# Patient Record
Sex: FEMALE | Race: WHITE | NOT HISPANIC OR LATINO | URBAN - METROPOLITAN AREA
[De-identification: names, ages, dates, MRNs, and addresses within clinical notes are randomized per-mention and may not be internally consistent; named-entity substitution may affect disease eponyms.]

---

## 2021-04-17 ENCOUNTER — WEB ENCOUNTER (OUTPATIENT)
Dept: URBAN - METROPOLITAN AREA CLINIC 35 | Facility: CLINIC | Age: 36
End: 2021-04-17

## 2021-04-20 ENCOUNTER — TELEPHONE ENCOUNTER (OUTPATIENT)
Dept: URBAN - METROPOLITAN AREA CLINIC 35 | Facility: CLINIC | Age: 36
End: 2021-04-20

## 2021-04-20 ENCOUNTER — OFFICE VISIT (OUTPATIENT)
Dept: URBAN - METROPOLITAN AREA CLINIC 35 | Facility: CLINIC | Age: 36
End: 2021-04-20

## 2021-04-20 VITALS
OXYGEN SATURATION: 99 % | HEIGHT: 69 IN | HEART RATE: 60 BPM | DIASTOLIC BLOOD PRESSURE: 70 MMHG | SYSTOLIC BLOOD PRESSURE: 119 MMHG | BODY MASS INDEX: 21.92 KG/M2 | WEIGHT: 148 LBS

## 2021-04-20 RX ORDER — LEVOTHYROXINE SODIUM 0.05 MG/1
1 TABLET IN THE MORNING ON AN EMPTY STOMACH TABLET ORAL ONCE A DAY
Qty: 30 | Status: ACTIVE | COMMUNITY

## 2021-04-20 NOTE — EXAM-MIGRATED EXAMINATIONS
GENERAL APPEARANCE: - alert, in no acute distress, well developed, well nourished;   HEAD: - normocephalic, atraumatic;   EYES: - sclera anicteric bilaterally;   ORAL CAVITY: - mucosa moist, MP 1;   NECK/THYROID: - neck supple, full range of motion, no cervical lymphadenopathy, no thyroid nodules, no thyromegaly, trachea midline;   SKIN: - no suspicious lesions, warm and dry, no spider angiomata, palmar erythema or icterus;   HEART: - no murmurs, regular rate and rhythm, S1, S2 normal;   LUNGS: - clear to auscultation bilaterally, good air movement, no wheezes, rales, rhonchi;   ABDOMEN: - bowel sounds present, no masses palpable, no organomegaly , no rebound tenderness, soft, nontender, nondistended;   MUSCULOSKELETAL: - normal posture, normal gait and station, no decreased range of motion;   EXTREMITIES: - no clubbing, cyanosis, or edema;   PSYCH: - cooperative with exam, mood/affect full range;

## 2021-04-20 NOTE — HPI-MIGRATED HPI
;     Rectal Bleeding : 36 year old female presents today for rectal bleeding. Onset 1-2 years ago and she feels that it is becoming more frequent. Blood is present in the water, on top of the stool and on the toilet paper. Blood is bright red in color. She feels ''a skin tag''. Patient states that she believes she has developed hemorrhoids due to lifting weights. 1 BM per day, with normal stool. No melena, or mucus.   She admits lifting weights for 2 years  No abdominal pain, weight loss, nausea, or emesis. No fever.  No IBD in family.  Grandfather die of gastric cancer at age 75. No colon cancer.;

## 2021-04-26 ENCOUNTER — OFFICE VISIT (OUTPATIENT)
Dept: URBAN - METROPOLITAN AREA SURGERY CENTER 8 | Facility: SURGERY CENTER | Age: 36
End: 2021-04-26

## 2021-05-11 ENCOUNTER — OFFICE VISIT (OUTPATIENT)
Dept: URBAN - METROPOLITAN AREA CLINIC 35 | Facility: CLINIC | Age: 36
End: 2021-05-11

## 2021-05-11 VITALS
TEMPERATURE: 97.9 F | WEIGHT: 146 LBS | DIASTOLIC BLOOD PRESSURE: 68 MMHG | SYSTOLIC BLOOD PRESSURE: 102 MMHG | BODY MASS INDEX: 21.62 KG/M2 | HEIGHT: 69 IN | HEART RATE: 59 BPM

## 2021-05-11 RX ORDER — LEVOTHYROXINE SODIUM 0.05 MG/1
1 TABLET IN THE MORNING ON AN EMPTY STOMACH TABLET ORAL ONCE A DAY
Qty: 30 | Status: ACTIVE | COMMUNITY

## 2021-05-11 NOTE — EXAM-MIGRATED EXAMINATIONS
GENERAL APPEARANCE: - alert, in no acute distress, well developed, well nourished;   HEAD: - normocephalic, atraumatic;   EYES: - sclera anicteric bilaterally;   SKIN: - no suspicious lesions, warm and dry, no spider angiomata, palmar erythema or icterus;   HEART: - no murmurs, regular rate and rhythm, S1, S2 normal;   LUNGS: - clear to auscultation bilaterally, good air movement, no wheezes, rales, rhonchi;   ABDOMEN: - bowel sounds present, no masses palpable, no organomegaly , no rebound tenderness, soft, nontender, nondistended;   EXTREMITIES: - no clubbing, cyanosis, or edema;   PSYCH: - cooperative with exam, mood/affect full range;

## 2021-05-11 NOTE — HPI-MIGRATED HPI
;     Rectal Bleeding : Patient presents today for follow up of rectal bleeding and to review colonoscopy completed on 4.26.2021. She denies any complications post procedure.   1 BMs per day, with normal stool. No melena, blood, or mucus.  She denies any more episodes of rectal bleeding.  GI MD Note: Colonoscopy: finding of rectal varices. Patient had splenectomy at age 4. She was told when she was 17 y/o had "fibrosis of liver" and used to have frequent labs collected. She has not had any issues since.  Last visit 4.20.21 36 year old female presents today for rectal bleeding. Onset 1-2 years ago and she feels that it is becoming more frequent. Blood is present in the water, on top of the stool and on the toilet paper. Blood is bright red in color. She feels '' a skin tag''. Patient states that she believes she has developed hemorrhoids due to lifting weights. 1 BM per day, with normal stool. No melena, or mucus.   She admits lifting weights for 2 years  No abdominal pain, weight loss, nausea, or emesis. No fever.  No IBD in family.  Grandfather die of gastric cancer at age 75. No colon cancer.;

## 2021-05-13 ENCOUNTER — TELEPHONE ENCOUNTER (OUTPATIENT)
Dept: URBAN - METROPOLITAN AREA CLINIC 35 | Facility: CLINIC | Age: 36
End: 2021-05-13

## 2021-05-14 ENCOUNTER — TELEPHONE ENCOUNTER (OUTPATIENT)
Dept: URBAN - METROPOLITAN AREA CLINIC 35 | Facility: CLINIC | Age: 36
End: 2021-05-14

## 2021-05-14 LAB
ABSOLUTE BASOPHILS: 90
ABSOLUTE EOSINOPHILS: 101
ABSOLUTE LYMPHOCYTES: 1696
ABSOLUTE MONOCYTES: 779
ABSOLUTE NEUTROPHILS: 2634
ALBUMIN/GLOBULIN RATIO: 1.7
ALBUMIN: 4.7
ALKALINE PHOSPHATASE: 53
ALT: 19
AST: 24
BASOPHILS: 1.7
BILIRUBIN, DIRECT: 0.1
BILIRUBIN, INDIRECT: 0.4
BILIRUBIN, TOTAL: 0.5
BUN/CREATININE RATIO: (no result)
CALCIUM: 9.8
CARBON DIOXIDE: 28
CHLORIDE: 102
CREATININE: 0.89
EGFR AFRICAN AMERICAN: 97
EGFR NON-AFR. AMERICAN: 83
EOSINOPHILS: 1.9
GLOBULIN: 2.8
GLUCOSE: 71
HEMATOCRIT: 38.6
HEMOGLOBIN: 12.9
HEPATITIS A AB, TOTAL: (no result)
HEPATITIS B SURFACE: (no result)
HEPATITIS B SURFACE: <5
HEPATITIS C ANTIBODY: (no result)
INR: 1.1
LYMPHOCYTES: 32
MCH: 31.7
MCHC: 33.4
MCV: 94.8
MONOCYTES: 14.7
MPV: 11.1
NEUTROPHILS: 49.7
PLATELET COUNT: 389
POTASSIUM: 4.3
PROTEIN, TOTAL: 7.5
PT: 11
RDW: 11.6
RED BLOOD CELL COUNT: 4.07
SIGNAL TO CUT-OFF: 0.46
SODIUM: 138
T4, FREE: 1.3
TSH: 3.86
UREA NITROGEN (BUN): 13
WHITE BLOOD CELL COUNT: 5.3

## 2021-05-19 ENCOUNTER — TELEPHONE ENCOUNTER (OUTPATIENT)
Dept: URBAN - METROPOLITAN AREA CLINIC 35 | Facility: CLINIC | Age: 36
End: 2021-05-19

## 2021-06-01 ENCOUNTER — TELEPHONE ENCOUNTER (OUTPATIENT)
Dept: URBAN - METROPOLITAN AREA CLINIC 35 | Facility: CLINIC | Age: 36
End: 2021-06-01

## 2021-06-08 ENCOUNTER — OFFICE VISIT (OUTPATIENT)
Dept: URBAN - METROPOLITAN AREA CLINIC 35 | Facility: CLINIC | Age: 36
End: 2021-06-08

## 2021-07-06 ENCOUNTER — OFFICE VISIT (OUTPATIENT)
Dept: URBAN - METROPOLITAN AREA CLINIC 35 | Facility: CLINIC | Age: 36
End: 2021-07-06

## 2021-07-06 VITALS
HEIGHT: 69 IN | DIASTOLIC BLOOD PRESSURE: 74 MMHG | BODY MASS INDEX: 21.03 KG/M2 | HEART RATE: 76 BPM | WEIGHT: 142 LBS | SYSTOLIC BLOOD PRESSURE: 112 MMHG | OXYGEN SATURATION: 99 %

## 2021-07-06 RX ORDER — LEVOTHYROXINE SODIUM 0.05 MG/1
1 TABLET IN THE MORNING ON AN EMPTY STOMACH TABLET ORAL ONCE A DAY
Qty: 30 | Status: ACTIVE | COMMUNITY

## 2021-07-06 NOTE — HPI-MIGRATED HPI
;     Rectal Bleeding : Patient presents today for a follow-up office visit from 05/11/2021. Patient presented initially with rectal bleeding. Colonoscopy subsequently revealed rectal varices. She had a CT Scan and labs completed on 05/13/2021 with results noted below. CT came back concerning for PV thrombosis. MRI was ordered for further evaluation. Due to insurance reason patient decided to have MRI done at a different facility ([facility]). completing Patient had MRI  done on 06/25/2021.  Currently she has 1 BM a day, stools are normal without blood, mucus or melena. Last occurrence of rectal bleeding was about two weeks ago. Blood was bright red only present on the toilet paper.     Per last visit MD Note: Patient was told many yrs ago to have "scarring of liver"; she was then approximately 19 y/o. Her MD's in [facility] used to check liver panel often. Patient needs work up for possible liver disease. Start with CT abd and pelvis and blood work as ordered.        Patient had a splenectomy in childhood.   MRI abdomen 6/25/21: NL GB and biliary tree, small amount fluid around tail of pancreas and increased fecal matter described. NO mention of PV or portal vein findings on CT.  After receiving MRI report, asked patient to bring a CD of initial CT study to MRI facility and I am planning to call Radiologist to explain case and compare films.        Last visit (05/11/2021) Patient presents today for follow up of rectal bleeding and to review colonoscopy completed on 4.26.2021. She denies any complications post procedure.   1 BM's per day, with normal stool. No melena, blood, or mucus.  She denies any more episodes of rectal bleeding.  GI MD Note: Colonoscopy: finding of rectal varices. Patient had splenectomy at age 4. She was told when she was 19 y/o had "fibrosis of liver" and used to have frequent labs collected. She has not had any issues since.  ;

## 2021-07-09 ENCOUNTER — TELEPHONE ENCOUNTER (OUTPATIENT)
Dept: URBAN - METROPOLITAN AREA CLINIC 35 | Facility: CLINIC | Age: 36
End: 2021-07-09

## 2021-08-03 ENCOUNTER — TELEPHONE ENCOUNTER (OUTPATIENT)
Dept: URBAN - METROPOLITAN AREA CLINIC 35 | Facility: CLINIC | Age: 36
End: 2021-08-03

## 2021-08-17 ENCOUNTER — OFFICE VISIT (OUTPATIENT)
Dept: URBAN - METROPOLITAN AREA CLINIC 35 | Facility: CLINIC | Age: 36
End: 2021-08-17

## 2021-08-17 NOTE — HPI-MIGRATED HPI
;     Rectal Bleeding : Patient presents today for a follow-up office visit from 07/06/2021.     Last visit. 07.06.21 Patient presents today for a follow-up office visit from 05/11/2021. She had a CT Scan and labs completed on 05/13/2021 with results noted below. She was then ordered to have a MRI per her CT  which was sent to [facility], she admits completing her MRI on 06/25/2021. Currently she has 1 BM a day, stools are normal without blood, mucus or melena. Last occurrence of rectal bleeding was about two weeks ago. Blood was bright red only present on the toilet paper.     Per last MD Note: Patient was told many yrs ago, when she was approximately 19 y/o, that she had "scarring of liver". Her MD's in [facility] used to check liver panel often .Patient need work up for possible liver disease. Start with CT abd and pelvis and blood work as ordered.         ;

## 2021-09-03 ENCOUNTER — TELEPHONE ENCOUNTER (OUTPATIENT)
Dept: URBAN - METROPOLITAN AREA CLINIC 35 | Facility: CLINIC | Age: 36
End: 2021-09-03

## 2022-02-23 ENCOUNTER — TELEPHONE ENCOUNTER (OUTPATIENT)
Dept: URBAN - METROPOLITAN AREA CLINIC 35 | Facility: CLINIC | Age: 37
End: 2022-02-23

## 2022-02-25 ENCOUNTER — OFFICE VISIT (OUTPATIENT)
Dept: URBAN - METROPOLITAN AREA CLINIC 31 | Facility: CLINIC | Age: 37
End: 2022-02-25
Payer: COMMERCIAL

## 2022-02-25 ENCOUNTER — DASHBOARD ENCOUNTERS (OUTPATIENT)
Age: 37
End: 2022-02-25

## 2022-02-25 VITALS
SYSTOLIC BLOOD PRESSURE: 120 MMHG | WEIGHT: 173 LBS | OXYGEN SATURATION: 98 % | BODY MASS INDEX: 25.62 KG/M2 | DIASTOLIC BLOOD PRESSURE: 74 MMHG | HEART RATE: 60 BPM | HEIGHT: 69 IN

## 2022-02-25 DIAGNOSIS — I81 PORTAL VEIN THROMBOSIS: ICD-10-CM

## 2022-02-25 DIAGNOSIS — K64.9 RECTAL VARICES: ICD-10-CM

## 2022-02-25 PROCEDURE — 99214 OFFICE O/P EST MOD 30 MIN: CPT | Performed by: INTERNAL MEDICINE

## 2022-02-25 RX ORDER — LEVOTHYROXINE SODIUM 75 UG/1
1 TABLET IN THE MORNING ON AN EMPTY STOMACH TABLET ORAL ONCE A DAY
Qty: 30 | Status: ACTIVE | COMMUNITY

## 2022-02-25 NOTE — PHYSICAL EXAM GASTROINTESTINAL
Abdomen , soft, nontender , no guarding or rigidity , no masses palpable , normal bowel sounds , Liver and Spleen , no hepatomegaly present , no hepatosplenomegaly , liver nontender , spleen not palpable,  35 weeks

## 2022-02-25 NOTE — HPI-TODAY'S VISIT:
Patient presents today to discuss scheduling a flex sig. Patient is 35 weeks pregnant, with a diagnosis of Portal vein thrombosis and history of rectal bleeding due to rectal varices.  Patient was seen by Hepatology at [facility] and plan is for patient to have a Felx Sig few weeks before wade nuñez before deciding if patient can have vaginal delivery vs C Section.  She currently denies rectal bleeding. 1 BM per day, with nomral stool.  Her pregnancy has been without any complications and no recurrent rectal bleeding.   Patient's Gyn MD is Dr. Cason.

## 2022-03-04 ENCOUNTER — TELEPHONE ENCOUNTER (OUTPATIENT)
Dept: URBAN - METROPOLITAN AREA CLINIC 36 | Facility: CLINIC | Age: 37
End: 2022-03-04

## 2022-03-18 ENCOUNTER — OFFICE VISIT (OUTPATIENT)
Dept: URBAN - METROPOLITAN AREA MEDICAL CENTER 10 | Facility: MEDICAL CENTER | Age: 37
End: 2022-03-18
Payer: COMMERCIAL

## 2022-03-18 DIAGNOSIS — K64.8 BLEEDING INTERNAL HEMORRHOIDS: ICD-10-CM

## 2022-03-18 DIAGNOSIS — K64.4 ANAL SKIN TAG: ICD-10-CM

## 2022-03-18 PROCEDURE — 45330 DIAGNOSTIC SIGMOIDOSCOPY: CPT | Performed by: INTERNAL MEDICINE

## 2022-03-18 RX ORDER — LEVOTHYROXINE SODIUM 75 UG/1
1 TABLET IN THE MORNING ON AN EMPTY STOMACH TABLET ORAL ONCE A DAY
Qty: 30 | Status: ACTIVE | COMMUNITY

## 2022-03-21 ENCOUNTER — OFFICE VISIT (OUTPATIENT)
Dept: URBAN - METROPOLITAN AREA SURGERY CENTER 8 | Facility: SURGERY CENTER | Age: 37
End: 2022-03-21

## 2024-07-08 ENCOUNTER — TELEPHONE ENCOUNTER (OUTPATIENT)
Dept: URBAN - METROPOLITAN AREA CLINIC 31 | Facility: CLINIC | Age: 39
End: 2024-07-08

## 2024-07-26 ENCOUNTER — OFFICE VISIT (OUTPATIENT)
Dept: URBAN - METROPOLITAN AREA CLINIC 31 | Facility: CLINIC | Age: 39
End: 2024-07-26

## 2024-08-20 ENCOUNTER — LAB OUTSIDE AN ENCOUNTER (OUTPATIENT)
Dept: URBAN - METROPOLITAN AREA CLINIC 35 | Facility: CLINIC | Age: 39
End: 2024-08-20

## 2024-08-20 ENCOUNTER — OFFICE VISIT (OUTPATIENT)
Dept: URBAN - METROPOLITAN AREA CLINIC 35 | Facility: CLINIC | Age: 39
End: 2024-08-20
Payer: COMMERCIAL

## 2024-08-20 VITALS
BODY MASS INDEX: 22.66 KG/M2 | OXYGEN SATURATION: 99 % | WEIGHT: 153 LBS | HEART RATE: 68 BPM | SYSTOLIC BLOOD PRESSURE: 110 MMHG | DIASTOLIC BLOOD PRESSURE: 70 MMHG | HEIGHT: 69 IN

## 2024-08-20 DIAGNOSIS — K64.8 EXTERNAL HEMORRHOIDS: ICD-10-CM

## 2024-08-20 DIAGNOSIS — I81 PORTAL VEIN THROMBOSIS: ICD-10-CM

## 2024-08-20 PROCEDURE — 99214 OFFICE O/P EST MOD 30 MIN: CPT | Performed by: INTERNAL MEDICINE

## 2024-08-20 RX ORDER — LEVOTHYROXINE SODIUM 75 UG/1
1 TABLET IN THE MORNING ON AN EMPTY STOMACH TABLET ORAL ONCE A DAY
Qty: 30 | Status: ACTIVE | COMMUNITY

## 2024-08-20 NOTE — HPI-TODAY'S VISIT:
39-year-old female patient with previous history of Portal vein thrombosis, rectal varices, splenectomy and hypothyroidism presents today for her regular visit. Patient currently mentions 1 bowel movement per day with solid consistency without blood, melena, or pain.    Patient has been doing well. She exercises 3-4 times a week. Patient has not had any recurrent rectal bleeding since initial incident 2021. Patient body builds and power lift with no issues. She went through pregnancy with vaginal delivery with no issues in 2022. Recent labs from June 2024 by PCP: NL CBC Last appointment  Last seen in 2022 with Flex Sig Last colonoscopy 04/2021 No specimens collected.

## 2024-08-31 LAB
ALBUMIN/GLOBULIN RATIO: 1.4
ALBUMIN: 4
ALKALINE PHOSPHATASE: 36
ALT (SGPT): 13
AST (SGOT): 22
BILIRUBIN, DIRECT: 0.1
BILIRUBIN, INDIRECT: 0.6
BILIRUBIN, TOTAL: 0.7
BUN/CREATININE RATIO: 13
CALCIUM: 9
CARBON DIOXIDE: 22
CHLORIDE: 105
CREATININE: 1.04
EGFR: 70
GLOBULIN: 2.8
GLUCOSE: 67
INR: 1.1
POTASSIUM: 4.8
PROTEIN, TOTAL: 6.8
PT: 11.8
SODIUM: 138
UREA NITROGEN (BUN): 14

## 2024-09-22 ENCOUNTER — TELEPHONE ENCOUNTER (OUTPATIENT)
Dept: URBAN - METROPOLITAN AREA CLINIC 35 | Facility: CLINIC | Age: 39
End: 2024-09-22

## 2024-10-10 ENCOUNTER — LAB OUTSIDE AN ENCOUNTER (OUTPATIENT)
Dept: URBAN - METROPOLITAN AREA CLINIC 35 | Facility: CLINIC | Age: 39
End: 2024-10-10

## 2024-10-11 ENCOUNTER — OFFICE VISIT (OUTPATIENT)
Dept: URBAN - METROPOLITAN AREA CLINIC 31 | Facility: CLINIC | Age: 39
End: 2024-10-11

## 2024-10-14 ENCOUNTER — TELEPHONE ENCOUNTER (OUTPATIENT)
Dept: URBAN - METROPOLITAN AREA CLINIC 35 | Facility: CLINIC | Age: 39
End: 2024-10-14